# Patient Record
Sex: FEMALE | Race: OTHER | NOT HISPANIC OR LATINO | URBAN - METROPOLITAN AREA
[De-identification: names, ages, dates, MRNs, and addresses within clinical notes are randomized per-mention and may not be internally consistent; named-entity substitution may affect disease eponyms.]

---

## 2020-11-20 ENCOUNTER — EMERGENCY (EMERGENCY)
Facility: HOSPITAL | Age: 75
LOS: 1 days | Discharge: HOME | End: 2020-11-20
Attending: EMERGENCY MEDICINE
Payer: COMMERCIAL

## 2020-11-20 VITALS
RESPIRATION RATE: 16 BRPM | WEIGHT: 179.9 LBS | TEMPERATURE: 99 F | HEART RATE: 69 BPM | DIASTOLIC BLOOD PRESSURE: 97 MMHG | SYSTOLIC BLOOD PRESSURE: 164 MMHG | HEIGHT: 64 IN | OXYGEN SATURATION: 96 %

## 2020-11-20 LAB
ALBUMIN SERPL ELPH-MCNC: 4.8 G/DL — SIGNIFICANT CHANGE UP (ref 3.3–5)
ALP SERPL-CCNC: 76 U/L — SIGNIFICANT CHANGE UP (ref 40–120)
ALT FLD-CCNC: 10 U/L — SIGNIFICANT CHANGE UP (ref 10–45)
ANION GAP SERPL CALC-SCNC: 11 MMOL/L — SIGNIFICANT CHANGE UP (ref 5–17)
APAP SERPL-MCNC: <15 UG/ML — SIGNIFICANT CHANGE UP (ref 10–30)
APPEARANCE UR: ABNORMAL
AST SERPL-CCNC: 19 U/L — SIGNIFICANT CHANGE UP (ref 10–40)
BACTERIA # UR AUTO: NEGATIVE — SIGNIFICANT CHANGE UP
BASOPHILS # BLD AUTO: 0.04 K/UL — SIGNIFICANT CHANGE UP (ref 0–0.2)
BASOPHILS NFR BLD AUTO: 0.5 % — SIGNIFICANT CHANGE UP (ref 0–2)
BILIRUB SERPL-MCNC: 0.5 MG/DL — SIGNIFICANT CHANGE UP (ref 0.2–1.2)
BILIRUB UR-MCNC: NEGATIVE — SIGNIFICANT CHANGE UP
BUN SERPL-MCNC: 20 MG/DL — SIGNIFICANT CHANGE UP (ref 7–23)
CALCIUM SERPL-MCNC: 9.6 MG/DL — SIGNIFICANT CHANGE UP (ref 8.4–10.5)
CHLORIDE SERPL-SCNC: 101 MMOL/L — SIGNIFICANT CHANGE UP (ref 96–108)
CO2 SERPL-SCNC: 29 MMOL/L — SIGNIFICANT CHANGE UP (ref 22–31)
COLOR SPEC: SIGNIFICANT CHANGE UP
CREAT SERPL-MCNC: 0.85 MG/DL — SIGNIFICANT CHANGE UP (ref 0.5–1.3)
DIFF PNL FLD: NEGATIVE — SIGNIFICANT CHANGE UP
EOSINOPHIL # BLD AUTO: 0.14 K/UL — SIGNIFICANT CHANGE UP (ref 0–0.5)
EOSINOPHIL NFR BLD AUTO: 1.9 % — SIGNIFICANT CHANGE UP (ref 0–6)
EPI CELLS # UR: 2 /HPF — SIGNIFICANT CHANGE UP
ETHANOL SERPL-MCNC: SIGNIFICANT CHANGE UP MG/DL (ref 0–10)
GLUCOSE SERPL-MCNC: 103 MG/DL — HIGH (ref 70–99)
GLUCOSE UR QL: NEGATIVE — SIGNIFICANT CHANGE UP
HCT VFR BLD CALC: 42 % — SIGNIFICANT CHANGE UP (ref 34.5–45)
HGB BLD-MCNC: 12.9 G/DL — SIGNIFICANT CHANGE UP (ref 11.5–15.5)
HYALINE CASTS # UR AUTO: 0 /LPF — SIGNIFICANT CHANGE UP (ref 0–2)
IMM GRANULOCYTES NFR BLD AUTO: 0.4 % — SIGNIFICANT CHANGE UP (ref 0–1.5)
KETONES UR-MCNC: NEGATIVE — SIGNIFICANT CHANGE UP
LEUKOCYTE ESTERASE UR-ACNC: ABNORMAL
LYMPHOCYTES # BLD AUTO: 1.77 K/UL — SIGNIFICANT CHANGE UP (ref 1–3.3)
LYMPHOCYTES # BLD AUTO: 23.8 % — SIGNIFICANT CHANGE UP (ref 13–44)
MCHC RBC-ENTMCNC: 27.9 PG — SIGNIFICANT CHANGE UP (ref 27–34)
MCHC RBC-ENTMCNC: 30.7 GM/DL — LOW (ref 32–36)
MCV RBC AUTO: 90.7 FL — SIGNIFICANT CHANGE UP (ref 80–100)
MONOCYTES # BLD AUTO: 0.78 K/UL — SIGNIFICANT CHANGE UP (ref 0–0.9)
MONOCYTES NFR BLD AUTO: 10.5 % — SIGNIFICANT CHANGE UP (ref 2–14)
NEUTROPHILS # BLD AUTO: 4.67 K/UL — SIGNIFICANT CHANGE UP (ref 1.8–7.4)
NEUTROPHILS NFR BLD AUTO: 62.9 % — SIGNIFICANT CHANGE UP (ref 43–77)
NITRITE UR-MCNC: NEGATIVE — SIGNIFICANT CHANGE UP
NRBC # BLD: 0 /100 WBCS — SIGNIFICANT CHANGE UP (ref 0–0)
PH UR: 7 — SIGNIFICANT CHANGE UP (ref 5–8)
PLATELET # BLD AUTO: 204 K/UL — SIGNIFICANT CHANGE UP (ref 150–400)
POTASSIUM SERPL-MCNC: 4.1 MMOL/L — SIGNIFICANT CHANGE UP (ref 3.5–5.3)
POTASSIUM SERPL-SCNC: 4.1 MMOL/L — SIGNIFICANT CHANGE UP (ref 3.5–5.3)
PROT SERPL-MCNC: 7.6 G/DL — SIGNIFICANT CHANGE UP (ref 6–8.3)
PROT UR-MCNC: NEGATIVE — SIGNIFICANT CHANGE UP
RBC # BLD: 4.63 M/UL — SIGNIFICANT CHANGE UP (ref 3.8–5.2)
RBC # FLD: 13.7 % — SIGNIFICANT CHANGE UP (ref 10.3–14.5)
RBC CASTS # UR COMP ASSIST: 1 /HPF — SIGNIFICANT CHANGE UP (ref 0–4)
SALICYLATES SERPL-MCNC: <2 MG/DL — LOW (ref 15–30)
SODIUM SERPL-SCNC: 141 MMOL/L — SIGNIFICANT CHANGE UP (ref 135–145)
SP GR SPEC: 1.01 — SIGNIFICANT CHANGE UP (ref 1.01–1.02)
UROBILINOGEN FLD QL: NEGATIVE — SIGNIFICANT CHANGE UP
WBC # BLD: 7.43 K/UL — SIGNIFICANT CHANGE UP (ref 3.8–10.5)
WBC # FLD AUTO: 7.43 K/UL — SIGNIFICANT CHANGE UP (ref 3.8–10.5)
WBC UR QL: 2 /HPF — SIGNIFICANT CHANGE UP (ref 0–5)

## 2020-11-20 PROCEDURE — 80307 DRUG TEST PRSMV CHEM ANLYZR: CPT

## 2020-11-20 PROCEDURE — 90792 PSYCH DIAG EVAL W/MED SRVCS: CPT | Mod: 95

## 2020-11-20 PROCEDURE — 99285 EMERGENCY DEPT VISIT HI MDM: CPT

## 2020-11-20 PROCEDURE — 82962 GLUCOSE BLOOD TEST: CPT

## 2020-11-20 PROCEDURE — 81001 URINALYSIS AUTO W/SCOPE: CPT

## 2020-11-20 PROCEDURE — 84443 ASSAY THYROID STIM HORMONE: CPT

## 2020-11-20 PROCEDURE — 87086 URINE CULTURE/COLONY COUNT: CPT

## 2020-11-20 PROCEDURE — 70450 CT HEAD/BRAIN W/O DYE: CPT

## 2020-11-20 PROCEDURE — 80053 COMPREHEN METABOLIC PANEL: CPT

## 2020-11-20 PROCEDURE — 85025 COMPLETE CBC W/AUTO DIFF WBC: CPT

## 2020-11-20 PROCEDURE — 70450 CT HEAD/BRAIN W/O DYE: CPT | Mod: 26

## 2020-11-20 NOTE — ED PROCEDURE NOTE - PROCEDURE ADDITIONAL DETAILS
Chris Lake D.O., PGY2 (Resident)  Peripheral IV access in the Emergency Department obtained under dynamic ultrasound guidance with dark nonpulsatile blood return.  Catheter was flushed afterwards without any resistance or resulting extravasation.. IV catheter confirmed in compressible vein after insertion.     right forearm, 20G

## 2020-11-20 NOTE — ED PROVIDER NOTE - PATIENT PORTAL LINK FT
You can access the FollowMyHealth Patient Portal offered by [facility] [place] by registering at the following website: [email or web address]. By joining [facility]’s FollowMyHealth portal, you will also be able to view your health information using other applications (apps) compatible with our system.

## 2020-11-20 NOTE — ED PROVIDER NOTE - PHYSICAL EXAMINATION
Vital signs reviewed  GENERAL: Patient nontoxic appearing, NAD  HEAD: NCAT  EYES: Anicteric  ENT: MMM  NECK: Supple, non tender  RESPIRATORY: Normal respiratory effort. CTA B/L. No wheezing, rales, rhonchi  CARDIOVASCULAR: Regular rate and rhythm  ABDOMEN: Soft. Nondistended. Nontender. No guarding or rebound. No CVA tenderness.  MUSCULOSKELETAL/EXTREMITIES: Brisk cap refill. 2+ radial pulses. No leg edema.  SKIN:  Warm and dry  NEURO: AAOx3. No gross FND.  PSYCHIATRIC: Cooperative. Affect appropriate. ambulating

## 2020-11-20 NOTE — ED ADULT NURSE REASSESSMENT NOTE - NS ED NURSE REASSESS COMMENT FT1
Report received from DUDLEY Fountain and DUDLEY pope in purple. Pt AxOx3, observed sitting up in stretcher conversing with RN without difficulty. Breathing spontaneous and unlabored with pulse ox >95% on room air. Pt well appearing, returned from CT scan, denies hallucinations at this time. Stating last time seen hallucinations was last night- endorses seeing people walking around. Denies HI/SI secondary to hallucinations. Pt updated on plan of care awaiting dispo. No acute distress noted. Call bell within reach.

## 2020-11-20 NOTE — ED PROVIDER NOTE - ATTENDING CONTRIBUTION TO CARE
Patient is a 74 yo F with history of HTN, afib on Eliquis sent in for evaluation of worsening hallucinations. Patient states she has had hallucinations for months (denies having them for years) but they have been worse recently and she can't sleep. She states she sees people in her home, snakes, bugs, ants and she is scared. Denies fevers, headache. She states she was diagnosed with a UTI last week and has been on an antibiotic for 2 days. she does not know what antibiotic that is.     VS noted  Gen. anxious  HEENT: EOMI, mmm  Lungs: CTAB/L no C/ W /R   CVS: RRR   Abd; Soft non tender, non distended   Ext: no edema  Skin: no rash  Neuro AAOx3 non focal clear speech  a/p: hallucinations - discussed with son (see progress note) - pt has no psych history, worsening symptoms in the past few weeks. She was diagnosed with a UTI last week, however also has had recent stressors with her sister and nephew passing away. Plan for CT Head, labs, u/a, ekg.   - Pinky MIRANDA Patient is a 76 yo F with history of HTN, afib on Eliquis sent in for evaluation of worsening hallucinations. Patient states she has had hallucinations for months (denies having them for years) but they have been worse recently and she can't sleep. She states she sees people in her home, snakes, bugs, ants and she is scared. Denies fevers, headache. She states she was diagnosed with a UTI last week and has been on an antibiotic for 2 days. she does not know what antibiotic that is.     VS noted  Gen. anxious  HEENT: EOMI, mmm  Lungs: CTAB/L no C/ W /R   CVS: RRR   Abd; Soft non tender, non distended   Ext: no edema  Skin: no rash  Neuro AAOx3 non focal clear speech  a/p: hallucinations - discussed with son (see progress note) - pt has no psych history, worsening symptoms in the past few weeks. She was diagnosed with a UTI last week, however also has had recent stressors with her sister and nephew passing away. Plan for CT Head, labs, u/a.  - Pinky MIRANDA

## 2020-11-20 NOTE — ED PROVIDER NOTE - OBJECTIVE STATEMENT
76 y/o F p/w hallucinations x  2monts. described as seening rats, seeing people at night coming into her house. happening everyday. states she also has bugs in bed, described as red/black bugs. does not endorse any other sxs. went to PCP recently who states she has had UTIs in the past. denies fever, chills, sob, cp, si/hi, depression, psychosis, changes in mood or affect.       [id number]

## 2020-11-20 NOTE — ED PROVIDER NOTE - PROGRESS NOTE DETAILS
Chris Lake D.O., PGY2 (Resident)  Sign out received on patient. Pending UA. Attempted to get collateral from family but was not able to get in contact. Will continue to try while evaluating and monitoring patient. Pinky MIRANDA: Discussed with son, [phone] [facility], states patient has been having hallucinations for about 5 years, has never had an evaluation for this in the past. Seeing snakes, people running around in the house, ants in the house, trying to attack to her. Smells something burning, smell is getting to her. She starts to hear things. Per son, she has a history of HTN, is on Eliquis for afib, surgery for hernia. NKDA. PMD: Dr. Norman Fang. Per son, patient was diagnosed with a UTI last week and is on, omeprazole, famotidine, Eliquis, Cardia, Nifedipine, Carvedilol, Losartan, Diltiazem, furosemide. No psychiatric history. No psychiatric admissions. No history of SI/ HI. Sister 2 months ago and nephew passed away 2 weeks ago. Pinky MIRANDA: Discussed with son, [phone] [facility], states patient has been having hallucinations for about 5 years, has never had an evaluation for this in the past. Seeing snakes, people running around in the house, ants in the house, trying to attack to her. Smells something burning, smell is getting to her. She starts to hear things. Per son, she has a history of HTN, is on Eliquis for afib, surgery for hernia. NKDA. PMD: Dr. Norman Fang. Per son, patient was diagnosed with a UTI last week and is on Abx. Meds include: omeprazole, famotidine, Eliquis, Cardia, Nifedipine, Carvedilol, Losartan, Diltiazem, furosemide. No psychiatric history. No psychiatric admissions. No history of SI/ HI. Sister  2 months ago and nephew passed away 2 weeks ago. Pinky MIRANDA: Lab work unremarkable. Plan for psych consult. Chris Lake D.O., PGY2 (Resident)  Psych resources faxed over. Provided to Patient. Family called to pick patient up. Patient stable for DC. All results and DC instructions endorsed.

## 2020-11-20 NOTE — ED PROVIDER NOTE - NSFOLLOWUPINSTRUCTIONS_ED_ALL_ED_FT
YOU WERE SEEN IN THE ED FOR: hallucinations    RESOURCES FOR PSYCHIATRY ARE PROVIDED. PLEASE SEE ATTACHED.    FOR PAIN/FEVER, YOU MAY TAKE TYLENOL (acetaminophen) AND/OR IBUPROFEN (advil or motrin). FOLLOW THE INSTRUCTIONS ON THE LABEL/CONTAINER.    PLEASE FOLLOW UP WITH YOUR PRIVATE PHYSICIAN WITHIN THE NEXT 72 HOURS. BRING COPIES OF YOUR RESULTS.    RETURN TO THE EMERGENCY DEPARTMENT IF YOU EXPERIENCE ANY NEW/CONCERNING/WORSENING SYMPTOMS.

## 2020-11-20 NOTE — ED ADULT TRIAGE NOTE - CHIEF COMPLAINT QUOTE
sent by PMD for hallucinations.  Patient has h/o hallucinations x 5 years worse recently  and at night no SI/HI

## 2020-11-20 NOTE — ED PROVIDER NOTE - CLINICAL SUMMARY MEDICAL DECISION MAKING FREE TEXT BOX
hallucinations - discussed with son (see progress note) - pt has no psych history, worsening symptoms in the past few weeks. She was diagnosed with a UTI last week, however also has had recent stressors with her sister and nephew passing away. Plan for CT Head, labs, u/a.

## 2020-11-20 NOTE — ED PROVIDER NOTE - NS ED ROS FT
Constitutional: No fever, chills.  Eyes:  No visual changes  ENMT:  No neck pain  Cardiac:  No chest pain  Respiratory:  No cough, SOB  GI:  No nausea, vomiting, diarrhea, abdominal pain.  :  No dysuria, hematuria  MS:  No back pain.  Neuro:  +hallucinations   Skin:  No skin rash  Endocrine: No history of thyroid disease or diabetes.  Except as documented in the HPI,  all other systems are negative.

## 2020-11-20 NOTE — ED ADULT NURSE NOTE - CHPI ED NUR SYMPTOMS NEG
no vomiting/no dizziness/no nausea/no chills/no fever/no pain/no decreased eating/drinking/no tingling

## 2020-11-20 NOTE — ED BEHAVIORAL HEALTH NOTE - BEHAVIORAL HEALTH NOTE
===================  PRE-HOSPITAL COURSE  ===================  SOURCE:  RN and triage documentation.   DETAILS:  Patient was brought in by son/daughter; chief complaint of VH.   ============  ED COURSE   ============  SOURCE:  RN and triage documentation.   ARRIVAL:  Patient was cooperative with triage process and allowed for gowning/wanding without incident. Patient presents with good hygiene/grooming.   BELONGINGS: No notable belongings; items stowed with security.   BEHAVIOR: Patient has been cooperative while in ED and has provided blood/urine for routine labs without incident. Patient presently denying present SI/HI, however endorses visual hallucinations of people in room with her. Patient presents with good eye contact. Patient's speech is of normal rate/volume; patient is AOx3 and presents with a logical thought process. Patient has been resting in hospital bed and has eaten/drank.   TREATMENT:  Patient has not required medication intervention while in ED.   VISITORS:  Patient presently unaccompanied by social supports while in ED.       COVID Exposure Screen- collateral (i.e. third-party, chart review, belongings, etc; include EMS and ED staff)     1.        *Has the patient had a COVID-19 test in the last 21 days?  (  ) Yes   ( X) No   (  ) Unknown- Reason: ______  IF YES PROCEED TO QUESTION #2. IF NO OR UNKNOWN THEN PLEASE SKIP TO QUESTION #3.  2.        Date of test: ________  3.        Do you know the result? (  ) Negative   (  ) Positive   (  ) No result available  4.        *In the past 14 days, has the patient been around anyone with a positive COVID-19 test?*  (  ) Yes   ( X) No   (  ) Unknown- Reason (e.g. collateral uncertain, refusing to answer, etc.):  ______  IF YES PROCEED TO QUESTION #5. IF NO or UNKNOWN, PLEASE SKIP TO QUESTION #10  5.        Was the patient within 6 feet of them for at least 15 minutes? (  ) Yes   (  ) No   (  ) Unknown- Reason: ______   6.        Did the patient provide care for them? (  ) Yes   (  ) No   (  ) Unknown- Reason: ______   7.        Did the patient have direct physical contact with them (touched, hugged, or kissed them)? (  ) Yes   (  ) No    (  ) Unknown- Reason: ______   8.        Did the patient share eating or drinking utensils with them? (  ) Yes   (  ) No    (  ) Unknown- Reason: ______   9.        Have they sneezed, coughed, or somehow got respiratory droplets on the patient? (  ) Yes   (  ) No    (  ) Unknown- Reason: ______   10.     *Have you been out of [place] within the past 14 days?*  (  ) Yes   ( X) No   (  ) Unknown- Reason (e.g. patient uncertain, sedated, refusing to answer, etc.): _______  IF YES PLEASE ANSWER THE FOLLOWING QUESTIONS:  11.     Which state/country have you been to? ______  12.     Were you there over 24 hours? (  ) Yes   ( X) No    (  ) Unknown- Reason: ______  13.     Date of return to [facility]: ______

## 2020-11-20 NOTE — ED PROCEDURE NOTE - ATTENDING CONTRIBUTION TO CARE
I have participated in and supervised all key portions of the above procedures and agree with the above documentation. - Pinky MIRANDA

## 2020-11-20 NOTE — ED ADULT NURSE NOTE - OBJECTIVE STATEMENT
75yr old female pt with pmh of hallucinations coming in from her PCP's office for increased hallucinations recently. Pt reports that she sees people and octopus. Pt denies hearing any voices. Pt denies any SOB, nausea, vomiting, fever or chills. Pt is oriented to person, birthday, year and place. Pts skin is intact and normal for race. VSS. Call bell placed at bedside. Will continue to monitor.

## 2020-11-20 NOTE — ED ADULT NURSE REASSESSMENT NOTE - NS ED NURSE REASSESS COMMENT FT1
Multiple unsuccessful attempts to obtain IV access and blood work. MD Lake made aware, ultrasound guided IV to be placed by MD.

## 2020-11-21 VITALS
TEMPERATURE: 98 F | DIASTOLIC BLOOD PRESSURE: 77 MMHG | HEART RATE: 58 BPM | RESPIRATION RATE: 20 BRPM | OXYGEN SATURATION: 98 % | SYSTOLIC BLOOD PRESSURE: 164 MMHG

## 2020-11-21 DIAGNOSIS — F03.90 UNSPECIFIED DEMENTIA, UNSPECIFIED SEVERITY, WITHOUT BEHAVIORAL DISTURBANCE, PSYCHOTIC DISTURBANCE, MOOD DISTURBANCE, AND ANXIETY: ICD-10-CM

## 2020-11-21 LAB — TSH SERPL-MCNC: 0.59 UIU/ML — SIGNIFICANT CHANGE UP (ref 0.27–4.2)

## 2020-11-21 NOTE — ED BEHAVIORAL HEALTH ASSESSMENT NOTE - RISK ASSESSMENT
risk factors include VH and that pt is not currently in psychiatric tx. protective factors include no prior formal psychiatric hx, no SI, no HI, no thought disorder, no paranoia, supportive family who is willing/able to provide safe environment, and the chronicity of pt's symptoms Low Acute Suicide Risk

## 2020-11-21 NOTE — ED BEHAVIORAL HEALTH ASSESSMENT NOTE - SUMMARY
the pt is presenting with reports of VH that have been persisting for several years but acutely worsening. she has no AH, no paranoia and no formal thought disorder. she is however presenting with memory deficits. her VH are likely in the context of a dementia picture with sundowning, especially since they worsen at nighttime. there could be possible additional delirium from recent uti's. pt also frequently uses benadryl for sleep which could also be worsening her cognition. given the chronicity of these symptoms, and that there are no acute safety concerns vocalized by pt nor collateral (son), and this likely is part of dementia disease process, it would be appropriate to discharge pt home with referrals for [facility]. discussed safety measures with son including locking away all sharps, son only handling pt's medications, and supervision at nighttime after aids go home. son verbalized that he would be able to do this. also advised to continue her abx for uti and to stop tylenol PM

## 2020-11-21 NOTE — ED BEHAVIORAL HEALTH ASSESSMENT NOTE - DESCRIPTION
COVID Exposure Screen- Patient AND Collateral- son     1.	*In the past 14 days, have you been around anyone with a positive COVID-19 test?*   (  ) Yes   ( x ) No   (  ) Unknown- Reason (e.g. patient uncertain, sedated, refusing to answer, etc.):  ______  IF YES PROCEED TO QUESTION #2. IF NO or UNKNOWN, PLEASE SKIP TO QUESTION #7  2.	Were you within 6 feet of them for at least 15 minutes? (  ) Yes   (  ) No   (  ) Unknown- Reason: ______    3.	Have you provided care for them? (  ) Yes   (  ) No   (  ) Unknown- Reason: ______    4.	Have you had direct physical contact with them (touched, hugged, or kissed them)? (  ) Yes   (  ) No    (  ) Unknown- Reason: ______    5.	Have you shared eating or drinking utensils with them? (  ) Yes   (  ) No    (  ) Unknown- Reason: ______    6.	Have they sneezed, coughed, or somehow got respiratory droplets on you? (  ) Yes   (  ) No    (  ) Unknown- Reason: ______    7.	*Have you been out of [place] within the past 14 days?*  (  ) Yes   (x  ) No   (  ) Unknown- Reason (e.g. patient uncertain, sedated, refusing to answer, etc.): _______  IF YES PLEASE ANSWER THE FOLLOWING QUESTIONS:  8.	Which state/country have you been to? ______   9.	Were you there over 24 hours? (  ) Yes   (  ) No    (  ) Unknown- Reason: ______    10.	What date did you return to [facility]? ______ HTN from [facility]; , has 5 children, lives with  who has stroke, has aid in the home

## 2020-11-21 NOTE — ED BEHAVIORAL HEALTH ASSESSMENT NOTE - SAFETY PLAN DETAILS
see above. discussed with son to call 911 to go to nearest ER if pt expresses any SI, HI, or worsening condition/unsafe behavior

## 2020-11-21 NOTE — ED BEHAVIORAL HEALTH ASSESSMENT NOTE - CURRENT MEDICATION
omeprazole, and other medications for BP as per son. he does not recall them on hand and pt denies being on any medications

## 2020-11-21 NOTE — ED ADULT NURSE REASSESSMENT NOTE - NS ED NURSE REASSESS COMMENT FT1
Pt AxOX3, observed sitting up in stretcher conversing with RN without difficulty. Breathing spontaneous and unlabored. Denies hallucinations at this time, Denies SI/HI. Pt updated on plan of care awaiting dispo after completing telepsych interview.   No acute distress noted. Call bell within reach.

## 2020-11-21 NOTE — ED BEHAVIORAL HEALTH ASSESSMENT NOTE - OTHER PAST PSYCHIATRIC HISTORY (INCLUDE DETAILS REGARDING ONSET, COURSE OF ILLNESS, INPATIENT/OUTPATIENT TREATMENT)
no prior psychiatric hx including no prior psychiatric hospitalizations, no SA, no SIB, no outpatient tx

## 2020-11-21 NOTE — ED BEHAVIORAL HEALTH ASSESSMENT NOTE - OTHER
deaths in the family deferred to medical ER at times begins to mumble no VH at the time of assessment nurse had to keep reintroducing herself to the pt; during exam pt had 1/3 and 0/3 work recall. did not even attempt serial 7's or 5's telepych, external billing

## 2020-11-21 NOTE — ED BEHAVIORAL HEALTH ASSESSMENT NOTE - HPI (INCLUDE ILLNESS QUALITY, SEVERITY, DURATION, TIMING, CONTEXT, MODIFYING FACTORS, ASSOCIATED SIGNS AND SYMPTOMS)
76yo  woman, from [facility], domiciled with her , pmhx of HTN, no prior psychiatric hx including no prior psychiatric hospitalizations, no SA, no SIB, no outpatient tx, no violence hx, no substance abuse hx, no legal hx, BIB son for worsening VH.     the following information obtained from pt and son. pt has been experiencing VH for the past few years. pt describes seeing animals  such as snakes, children, and sometimes adults inside her house. they mostly occur at night but she has seen them during the day. for years she stated that they don't bother her, but lately she has been reporting feeling scared and sometimes questions whether they are real. she sleeps with a flashlight at night so that she can see them. she recently told her son that maybe they were drinking her water on her night stand. pt states that she is unsure if they would hurt her. she denies any AH. she denies any paranoid ideation. she denies any HI or thoughts of hurting the people and animals she is seeing. she has no h/o picking up knives or acting aggressively in any way. no SI/I/P or SIB.    at home pt lives with her  who has had 2 strokes. there is an aid who comes 9-5pm daily at the house, mainly for the , who cooks and cleans. son keeps pt's medication in pill box and visits the house every evening. he has not noted any memory impairment. pt has never wandered and independently showers. of note, pt has been having several UTI's and also has been taking tylenol PM for a long time for sleep. pt has no h/o SI or suicidal behavior. she has no h/o psychiatric illness in her adult like other than VH that began a few years ago. no family h/o mental illness.     while in the ER the pt did have some memory impairment. nurse kept having to reintroduce herself to the pt because pt would forget her. during evaluation pt knew mo and year but not date. her recall was 1/3 and 0/3. she would not attempt serial 5's or 7's. she was mostly linear but sometimes tangential and talk about her past. she states that she isnt on any medication although son states that she takes several.

## 2020-11-21 NOTE — ED BEHAVIORAL HEALTH ASSESSMENT NOTE - SUICIDE PROTECTIVE FACTORS
Fear of death or the actual act of killing self/Responsibility to family and others/Supportive social network of family or friends/Ability to cope with stress/Identifies reasons for living/Cultural, spiritual and/or moral attitudes against suicide

## 2020-11-22 LAB
CULTURE RESULTS: SIGNIFICANT CHANGE UP
SPECIMEN SOURCE: SIGNIFICANT CHANGE UP

## 2020-11-22 NOTE — ED POST DISCHARGE NOTE - DETAILS
Alex with son, pts mental status improving, still on Abx.  informed of UCx results, to finish Abx and follow up with her PCP that started the Abx in the first  place.  Return precautions discussed.  Zelalem
